# Patient Record
Sex: MALE | Race: WHITE | URBAN - METROPOLITAN AREA
[De-identification: names, ages, dates, MRNs, and addresses within clinical notes are randomized per-mention and may not be internally consistent; named-entity substitution may affect disease eponyms.]

---

## 2017-06-02 ENCOUNTER — OUTPATIENT (OUTPATIENT)
Dept: OUTPATIENT SERVICES | Facility: HOSPITAL | Age: 77
LOS: 1 days | Discharge: HOME | End: 2017-06-02
Payer: MEDICARE

## 2017-06-02 DIAGNOSIS — N40.0 BENIGN PROSTATIC HYPERPLASIA WITHOUT LOWER URINARY TRACT SYMPTOMS: ICD-10-CM

## 2017-06-02 DIAGNOSIS — K22.70 BARRETT'S ESOPHAGUS WITHOUT DYSPLASIA: ICD-10-CM

## 2017-06-02 DIAGNOSIS — N18.3 CHRONIC KIDNEY DISEASE, STAGE 3 (MODERATE): ICD-10-CM

## 2017-06-02 DIAGNOSIS — E78.5 HYPERLIPIDEMIA, UNSPECIFIED: ICD-10-CM

## 2017-06-02 DIAGNOSIS — E11.9 TYPE 2 DIABETES MELLITUS WITHOUT COMPLICATIONS: ICD-10-CM

## 2017-06-02 DIAGNOSIS — Z95.1 PRESENCE OF AORTOCORONARY BYPASS GRAFT: ICD-10-CM

## 2017-06-02 DIAGNOSIS — K21.9 GASTRO-ESOPHAGEAL REFLUX DISEASE WITHOUT ESOPHAGITIS: ICD-10-CM

## 2017-06-02 DIAGNOSIS — I25.10 ATHEROSCLEROTIC HEART DISEASE OF NATIVE CORONARY ARTERY WITHOUT ANGINA PECTORIS: ICD-10-CM

## 2017-06-02 DIAGNOSIS — Z01.818 ENCOUNTER FOR OTHER PREPROCEDURAL EXAMINATION: ICD-10-CM

## 2017-06-02 LAB
ANION GAP SERPL CALC-SCNC: 5 MMOL/L — SIGNIFICANT CHANGE UP (ref 5–17)
BASOPHILS # BLD AUTO: 0 K/UL — SIGNIFICANT CHANGE UP (ref 0–0.2)
BASOPHILS NFR BLD AUTO: 0.7 % — SIGNIFICANT CHANGE UP (ref 0–2)
BUN SERPL-MCNC: 29 MG/DL — HIGH (ref 7–23)
CALCIUM SERPL-MCNC: 8.4 MG/DL — LOW (ref 8.5–10.1)
CHLORIDE SERPL-SCNC: 111 MMOL/L — HIGH (ref 96–108)
CO2 SERPL-SCNC: 24 MMOL/L — SIGNIFICANT CHANGE UP (ref 22–31)
CREAT SERPL-MCNC: 1.8 MG/DL — HIGH (ref 0.5–1.3)
EOSINOPHIL # BLD AUTO: 0.2 K/UL — SIGNIFICANT CHANGE UP (ref 0–0.5)
EOSINOPHIL NFR BLD AUTO: 3.1 % — SIGNIFICANT CHANGE UP (ref 0–6)
GLUCOSE SERPL-MCNC: 111 MG/DL — HIGH (ref 70–99)
HCT VFR BLD CALC: 41.9 % — SIGNIFICANT CHANGE UP (ref 39–50)
HGB BLD-MCNC: 14.3 G/DL — SIGNIFICANT CHANGE UP (ref 13–17)
LYMPHOCYTES # BLD AUTO: 1.8 K/UL — SIGNIFICANT CHANGE UP (ref 1–3.3)
LYMPHOCYTES # BLD AUTO: 27.7 % — SIGNIFICANT CHANGE UP (ref 13–44)
MCHC RBC-ENTMCNC: 32 PG — SIGNIFICANT CHANGE UP (ref 27–34)
MCHC RBC-ENTMCNC: 34.1 GM/DL — SIGNIFICANT CHANGE UP (ref 32–36)
MCV RBC AUTO: 93.9 FL — SIGNIFICANT CHANGE UP (ref 80–100)
MONOCYTES # BLD AUTO: 0.6 K/UL — SIGNIFICANT CHANGE UP (ref 0–0.9)
MONOCYTES NFR BLD AUTO: 8.9 % — SIGNIFICANT CHANGE UP (ref 2–14)
NEUTROPHILS # BLD AUTO: 3.9 K/UL — SIGNIFICANT CHANGE UP (ref 1.8–7.4)
NEUTROPHILS NFR BLD AUTO: 59.6 % — SIGNIFICANT CHANGE UP (ref 43–77)
PLATELET # BLD AUTO: 180 K/UL — SIGNIFICANT CHANGE UP (ref 150–400)
POTASSIUM SERPL-MCNC: 4.6 MMOL/L — SIGNIFICANT CHANGE UP (ref 3.5–5.3)
POTASSIUM SERPL-SCNC: 4.6 MMOL/L — SIGNIFICANT CHANGE UP (ref 3.5–5.3)
RBC # BLD: 4.47 M/UL — SIGNIFICANT CHANGE UP (ref 4.2–5.8)
RBC # FLD: 12.2 % — SIGNIFICANT CHANGE UP (ref 10.3–14.5)
SODIUM SERPL-SCNC: 140 MMOL/L — SIGNIFICANT CHANGE UP (ref 135–145)
WBC # BLD: 6.6 K/UL — SIGNIFICANT CHANGE UP (ref 3.8–10.5)
WBC # FLD AUTO: 6.6 K/UL — SIGNIFICANT CHANGE UP (ref 3.8–10.5)

## 2017-06-02 PROCEDURE — 93010 ELECTROCARDIOGRAM REPORT: CPT

## 2017-06-13 ENCOUNTER — OUTPATIENT (OUTPATIENT)
Dept: OUTPATIENT SERVICES | Facility: HOSPITAL | Age: 77
LOS: 1 days | Discharge: HOME | End: 2017-06-13
Payer: MEDICARE

## 2017-06-13 VITALS
RESPIRATION RATE: 16 BRPM | TEMPERATURE: 97 F | WEIGHT: 178.79 LBS | DIASTOLIC BLOOD PRESSURE: 74 MMHG | HEIGHT: 68 IN | HEART RATE: 62 BPM | SYSTOLIC BLOOD PRESSURE: 145 MMHG | OXYGEN SATURATION: 97 %

## 2017-06-13 PROCEDURE — 88305 TISSUE EXAM BY PATHOLOGIST: CPT | Mod: 26

## 2017-06-13 PROCEDURE — 88312 SPECIAL STAINS GROUP 1: CPT | Mod: 26

## 2017-06-13 PROCEDURE — 88313 SPECIAL STAINS GROUP 2: CPT | Mod: 26

## 2017-06-13 RX ORDER — SODIUM CHLORIDE 9 MG/ML
1000 INJECTION INTRAMUSCULAR; INTRAVENOUS; SUBCUTANEOUS
Qty: 0 | Refills: 0 | Status: DISCONTINUED | OUTPATIENT
Start: 2017-06-13 | End: 2017-06-28

## 2017-06-13 RX ADMIN — SODIUM CHLORIDE 75 MILLILITER(S): 9 INJECTION INTRAMUSCULAR; INTRAVENOUS; SUBCUTANEOUS at 10:25

## 2017-06-13 NOTE — ASU PATIENT PROFILE, ADULT - PMH
Arteriosclerosis    ASHD (arteriosclerotic heart disease)    Rodriguez esophagus    BPH (benign prostatic hypertrophy)    Chronic kidney disease (CKD), stage 3 (moderate)    Diabetes 1.5, managed as type 2    GERD (gastroesophageal reflux disease)    Goiter colloid, toxic, nodular    HTN (hypertension)    Hypercholesteremia

## 2017-06-14 LAB — SURGICAL PATHOLOGY FINAL REPORT - CH: SIGNIFICANT CHANGE UP

## 2017-06-15 DIAGNOSIS — Z79.82 LONG TERM (CURRENT) USE OF ASPIRIN: ICD-10-CM

## 2017-06-15 DIAGNOSIS — K22.70 BARRETT'S ESOPHAGUS WITHOUT DYSPLASIA: ICD-10-CM

## 2017-06-15 DIAGNOSIS — I25.10 ATHEROSCLEROTIC HEART DISEASE OF NATIVE CORONARY ARTERY WITHOUT ANGINA PECTORIS: ICD-10-CM

## 2017-06-15 DIAGNOSIS — I10 ESSENTIAL (PRIMARY) HYPERTENSION: ICD-10-CM

## 2017-06-15 DIAGNOSIS — Z87.891 PERSONAL HISTORY OF NICOTINE DEPENDENCE: ICD-10-CM

## 2017-06-15 DIAGNOSIS — K44.9 DIAPHRAGMATIC HERNIA WITHOUT OBSTRUCTION OR GANGRENE: ICD-10-CM

## 2017-06-15 DIAGNOSIS — Z95.1 PRESENCE OF AORTOCORONARY BYPASS GRAFT: ICD-10-CM

## 2017-06-15 DIAGNOSIS — E78.5 HYPERLIPIDEMIA, UNSPECIFIED: ICD-10-CM

## 2017-06-15 DIAGNOSIS — N40.0 BENIGN PROSTATIC HYPERPLASIA WITHOUT LOWER URINARY TRACT SYMPTOMS: ICD-10-CM

## 2017-06-26 ENCOUNTER — OUTPATIENT (OUTPATIENT)
Dept: OUTPATIENT SERVICES | Facility: HOSPITAL | Age: 77
LOS: 1 days | Discharge: HOME | End: 2017-06-26

## 2017-06-26 DIAGNOSIS — Z95.1 PRESENCE OF AORTOCORONARY BYPASS GRAFT: Chronic | ICD-10-CM

## 2017-06-26 LAB
ANION GAP SERPL CALC-SCNC: 7 MMOL/L — SIGNIFICANT CHANGE UP (ref 5–17)
APTT BLD: 28.6 SEC — SIGNIFICANT CHANGE UP (ref 27.5–37.4)
BUN SERPL-MCNC: 21 MG/DL — SIGNIFICANT CHANGE UP (ref 7–23)
CALCIUM SERPL-MCNC: 8.7 MG/DL — SIGNIFICANT CHANGE UP (ref 8.5–10.1)
CHLORIDE SERPL-SCNC: 111 MMOL/L — HIGH (ref 96–108)
CO2 SERPL-SCNC: 24 MMOL/L — SIGNIFICANT CHANGE UP (ref 22–31)
CREAT SERPL-MCNC: 1.79 MG/DL — HIGH (ref 0.5–1.3)
GLUCOSE SERPL-MCNC: 116 MG/DL — HIGH (ref 70–99)
HCT VFR BLD CALC: 39.7 % — SIGNIFICANT CHANGE UP (ref 39–50)
HGB BLD-MCNC: 13.9 G/DL — SIGNIFICANT CHANGE UP (ref 13–17)
INR BLD: 1.04 RATIO — SIGNIFICANT CHANGE UP (ref 0.88–1.16)
MCHC RBC-ENTMCNC: 32.6 PG — SIGNIFICANT CHANGE UP (ref 27–34)
MCHC RBC-ENTMCNC: 35 GM/DL — SIGNIFICANT CHANGE UP (ref 32–36)
MCV RBC AUTO: 93.2 FL — SIGNIFICANT CHANGE UP (ref 80–100)
PLATELET # BLD AUTO: 163 K/UL — SIGNIFICANT CHANGE UP (ref 150–400)
POTASSIUM SERPL-MCNC: 4.3 MMOL/L — SIGNIFICANT CHANGE UP (ref 3.5–5.3)
POTASSIUM SERPL-SCNC: 4.3 MMOL/L — SIGNIFICANT CHANGE UP (ref 3.5–5.3)
PROTHROM AB SERPL-ACNC: 11.2 SEC — SIGNIFICANT CHANGE UP (ref 9.8–12.7)
RBC # BLD: 4.26 M/UL — SIGNIFICANT CHANGE UP (ref 4.2–5.8)
RBC # FLD: 11.8 % — SIGNIFICANT CHANGE UP (ref 10.3–14.5)
SODIUM SERPL-SCNC: 142 MMOL/L — SIGNIFICANT CHANGE UP (ref 135–145)
WBC # BLD: 6.1 K/UL — SIGNIFICANT CHANGE UP (ref 3.8–10.5)
WBC # FLD AUTO: 6.1 K/UL — SIGNIFICANT CHANGE UP (ref 3.8–10.5)

## 2017-06-26 NOTE — H&P CARDIOLOGY - FAMILY HISTORY
Sibling  Still living? Yes, Estimated age: Age Unknown  Family history of acute myocardial infarction, Age at diagnosis: Age Unknown

## 2017-06-26 NOTE — H&P CARDIOLOGY - HISTORY OF PRESENT ILLNESS
77yr old male PMH HTN, HLD, diet controlled DM, CKD stage III, CAD, CABG, BPH, GERD had a routine stress test which revealed 77yr old male PMH HTN, HLD, diet controlled DM, CKD stage III, CAD, CABG, BPH, GERD had a routine stress test which revealed abnormal myocardial perfusion consistent with single or double vessel disease. Pt now referred for [facility] for further evaluation. Pt. denies chest pain, SOB, palpitations, lightheadedness, dizziness, chills, fever.

## 2017-06-26 NOTE — H&P CARDIOLOGY - COMMENTS
77yr old male PMH HTN, HLD, diet controlled DM, CKD stage III, CAD, CABG, BPH, GERD had a routine stress test which revealed abnormal myocardial perfusion consistent with single or double vessel disease. Pt now referred for LHC for further evaluation.   LHC risks, benefits and alternatives discussed with patient. Risk discussed included, but not limited to MI, stroke, mortality, major bleeding, arrythmia, or infection. Consent obtained and signed educational material provided. Pt. verbalizes and understands pre-procedural instructions.

## 2017-06-27 ENCOUNTER — OUTPATIENT (OUTPATIENT)
Dept: OUTPATIENT SERVICES | Facility: HOSPITAL | Age: 77
LOS: 1 days | Discharge: HOME | End: 2017-06-27
Payer: MEDICARE

## 2017-06-27 VITALS
TEMPERATURE: 97 F | SYSTOLIC BLOOD PRESSURE: 183 MMHG | HEIGHT: 68 IN | DIASTOLIC BLOOD PRESSURE: 73 MMHG | WEIGHT: 175.05 LBS | HEART RATE: 58 BPM | OXYGEN SATURATION: 97 % | RESPIRATION RATE: 16 BRPM

## 2017-06-27 VITALS
HEART RATE: 75 BPM | RESPIRATION RATE: 16 BRPM | SYSTOLIC BLOOD PRESSURE: 162 MMHG | OXYGEN SATURATION: 97 % | DIASTOLIC BLOOD PRESSURE: 73 MMHG

## 2017-06-27 DIAGNOSIS — Z95.1 PRESENCE OF AORTOCORONARY BYPASS GRAFT: Chronic | ICD-10-CM

## 2017-06-27 LAB
ANION GAP SERPL CALC-SCNC: 8 MMOL/L — SIGNIFICANT CHANGE UP (ref 5–17)
BUN SERPL-MCNC: 24 MG/DL — HIGH (ref 7–23)
CALCIUM SERPL-MCNC: 8.6 MG/DL — SIGNIFICANT CHANGE UP (ref 8.5–10.1)
CHLORIDE SERPL-SCNC: 111 MMOL/L — HIGH (ref 96–108)
CO2 SERPL-SCNC: 25 MMOL/L — SIGNIFICANT CHANGE UP (ref 22–31)
CREAT SERPL-MCNC: 1.82 MG/DL — HIGH (ref 0.5–1.3)
GLUCOSE SERPL-MCNC: 112 MG/DL — HIGH (ref 70–99)
POTASSIUM SERPL-MCNC: 4.3 MMOL/L — SIGNIFICANT CHANGE UP (ref 3.5–5.3)
POTASSIUM SERPL-SCNC: 4.3 MMOL/L — SIGNIFICANT CHANGE UP (ref 3.5–5.3)
SODIUM SERPL-SCNC: 144 MMOL/L — SIGNIFICANT CHANGE UP (ref 135–145)

## 2017-06-27 PROCEDURE — 93459 L HRT ART/GRFT ANGIO: CPT | Mod: 26

## 2017-06-27 RX ORDER — NITROGLYCERIN 6.5 MG
1 CAPSULE, EXTENDED RELEASE ORAL ONCE
Qty: 0 | Refills: 0 | Status: DISCONTINUED | OUTPATIENT
Start: 2017-06-27 | End: 2017-07-12

## 2017-06-27 RX ORDER — METOPROLOL TARTRATE 50 MG
0 TABLET ORAL
Qty: 0 | Refills: 0 | COMMUNITY

## 2017-06-27 RX ORDER — FOLIC ACID 0.8 MG
1 TABLET ORAL
Qty: 0 | Refills: 0 | COMMUNITY

## 2017-06-27 NOTE — PROGRESS NOTE ADULT - SUBJECTIVE AND OBJECTIVE BOX
s/p LHC revealing patent grafts. Vein graft to OM1 supplies entire dominant LCX system, there is no disease present.    Patient feels well.  Denies chest pain, shortness of breath, dizziness or palpitations at this time    Right groin procedure site CDI.  no bleeding, no hematoma, site soft, non tender, positive pedal pulses bilaterally        HPI:  77yr old male PMH HTN, HLD, diet controlled DM, CKD stage III, CAD, CABG, BPH, GERD had a routine stress test which revealed abnormal myocardial perfusion consistent with single or double vessel [id number] old male PMH HTN, HLD, diet controlled DM, CKD stage III, CAD, CABG, BPH, GERD had a routine stress test which revealed  now s/p LHC revealing non obstructive CAD and patent grafts    --vital signs, labs, diet and activity per post procedure orders  -ambulate ad akilah post bedrest  -iv hydration for elevated creatinine  -encourage PO fluids  -plan of care discussed with patient, family and MD Fleischmanns  -d/c after bedrest if stable  -post procedure and d/c instructions reviewed  -follow up with MD Alexandra in 1-2 weeks  -consider ACE-I for better BP control at outpatient follow up   -Discussed therapeutic lifestyle changes to reduce risk factors such as following a cardiac diet, weight loss, maintaining a healthy weight, exercise, smoking cessation, medication compliance, and regular follow-up  with MD to know your numbers (BP, cholesterol, weight, and glucose)

## 2017-06-29 DIAGNOSIS — Z87.891 PERSONAL HISTORY OF NICOTINE DEPENDENCE: ICD-10-CM

## 2017-06-29 DIAGNOSIS — R94.39 ABNORMAL RESULT OF OTHER CARDIOVASCULAR FUNCTION STUDY: ICD-10-CM

## 2017-06-29 DIAGNOSIS — E11.9 TYPE 2 DIABETES MELLITUS WITHOUT COMPLICATIONS: ICD-10-CM

## 2017-06-29 DIAGNOSIS — N40.0 BENIGN PROSTATIC HYPERPLASIA WITHOUT LOWER URINARY TRACT SYMPTOMS: ICD-10-CM

## 2017-06-29 DIAGNOSIS — Z95.1 PRESENCE OF AORTOCORONARY BYPASS GRAFT: ICD-10-CM

## 2017-06-29 DIAGNOSIS — I12.9 HYPERTENSIVE CHRONIC KIDNEY DISEASE WITH STAGE 1 THROUGH STAGE 4 CHRONIC KIDNEY DISEASE, OR UNSPECIFIED CHRONIC KIDNEY DISEASE: ICD-10-CM

## 2017-06-29 DIAGNOSIS — I25.10 ATHEROSCLEROTIC HEART DISEASE OF NATIVE CORONARY ARTERY WITHOUT ANGINA PECTORIS: ICD-10-CM

## 2017-06-29 DIAGNOSIS — K21.9 GASTRO-ESOPHAGEAL REFLUX DISEASE WITHOUT ESOPHAGITIS: ICD-10-CM

## 2017-06-29 DIAGNOSIS — E78.5 HYPERLIPIDEMIA, UNSPECIFIED: ICD-10-CM

## 2017-06-29 DIAGNOSIS — N18.3 CHRONIC KIDNEY DISEASE, STAGE 3 (MODERATE): ICD-10-CM

## 2018-06-07 ENCOUNTER — OUTPATIENT (OUTPATIENT)
Dept: OUTPATIENT SERVICES | Facility: HOSPITAL | Age: 78
LOS: 1 days | Discharge: HOME | End: 2018-06-07
Payer: MEDICARE

## 2018-06-07 DIAGNOSIS — Z01.818 ENCOUNTER FOR OTHER PREPROCEDURAL EXAMINATION: ICD-10-CM

## 2018-06-07 DIAGNOSIS — D12.2 BENIGN NEOPLASM OF ASCENDING COLON: ICD-10-CM

## 2018-06-07 DIAGNOSIS — Z95.1 PRESENCE OF AORTOCORONARY BYPASS GRAFT: Chronic | ICD-10-CM

## 2018-06-07 DIAGNOSIS — K44.9 DIAPHRAGMATIC HERNIA WITHOUT OBSTRUCTION OR GANGRENE: ICD-10-CM

## 2018-06-07 DIAGNOSIS — K62.1 RECTAL POLYP: ICD-10-CM

## 2018-06-07 DIAGNOSIS — K22.710 BARRETT'S ESOPHAGUS WITH LOW GRADE DYSPLASIA: ICD-10-CM

## 2018-06-07 DIAGNOSIS — K31.89 OTHER DISEASES OF STOMACH AND DUODENUM: ICD-10-CM

## 2018-06-07 DIAGNOSIS — K21.0 GASTRO-ESOPHAGEAL REFLUX DISEASE WITH ESOPHAGITIS: ICD-10-CM

## 2018-06-07 DIAGNOSIS — Z86.010 PERSONAL HISTORY OF COLONIC POLYPS: ICD-10-CM

## 2018-06-07 DIAGNOSIS — K63.5 POLYP OF COLON: ICD-10-CM

## 2018-06-07 PROCEDURE — 93010 ELECTROCARDIOGRAM REPORT: CPT

## 2018-06-15 ENCOUNTER — RESULT REVIEW (OUTPATIENT)
Age: 78
End: 2018-06-15

## 2018-06-15 ENCOUNTER — OUTPATIENT (OUTPATIENT)
Dept: OUTPATIENT SERVICES | Facility: HOSPITAL | Age: 78
LOS: 1 days | Discharge: HOME | End: 2018-06-15
Payer: MEDICARE

## 2018-06-15 VITALS
HEIGHT: 68 IN | DIASTOLIC BLOOD PRESSURE: 85 MMHG | RESPIRATION RATE: 19 BRPM | TEMPERATURE: 97 F | HEART RATE: 70 BPM | OXYGEN SATURATION: 96 % | WEIGHT: 175.05 LBS | SYSTOLIC BLOOD PRESSURE: 155 MMHG

## 2018-06-15 DIAGNOSIS — Z95.1 PRESENCE OF AORTOCORONARY BYPASS GRAFT: Chronic | ICD-10-CM

## 2018-06-15 PROCEDURE — 88305 TISSUE EXAM BY PATHOLOGIST: CPT | Mod: 26

## 2018-06-15 RX ORDER — PREGABALIN 225 MG/1
1 CAPSULE ORAL
Qty: 0 | Refills: 0 | COMMUNITY

## 2018-06-19 LAB — SURGICAL PATHOLOGY FINAL REPORT - CH: SIGNIFICANT CHANGE UP

## 2018-06-20 DIAGNOSIS — Z79.82 LONG TERM (CURRENT) USE OF ASPIRIN: ICD-10-CM

## 2018-06-20 DIAGNOSIS — K22.710 BARRETT'S ESOPHAGUS WITH LOW GRADE DYSPLASIA: ICD-10-CM

## 2018-06-20 DIAGNOSIS — K31.89 OTHER DISEASES OF STOMACH AND DUODENUM: ICD-10-CM

## 2018-06-20 DIAGNOSIS — K63.5 POLYP OF COLON: ICD-10-CM

## 2018-06-20 DIAGNOSIS — E78.00 PURE HYPERCHOLESTEROLEMIA, UNSPECIFIED: ICD-10-CM

## 2018-06-20 DIAGNOSIS — Z87.891 PERSONAL HISTORY OF NICOTINE DEPENDENCE: ICD-10-CM

## 2018-06-20 DIAGNOSIS — Z86.010 PERSONAL HISTORY OF COLONIC POLYPS: ICD-10-CM

## 2018-06-20 DIAGNOSIS — N40.0 BENIGN PROSTATIC HYPERPLASIA WITHOUT LOWER URINARY TRACT SYMPTOMS: ICD-10-CM

## 2018-06-20 DIAGNOSIS — K57.30 DIVERTICULOSIS OF LARGE INTESTINE WITHOUT PERFORATION OR ABSCESS WITHOUT BLEEDING: ICD-10-CM

## 2018-06-20 DIAGNOSIS — K44.9 DIAPHRAGMATIC HERNIA WITHOUT OBSTRUCTION OR GANGRENE: ICD-10-CM

## 2018-06-20 DIAGNOSIS — I12.9 HYPERTENSIVE CHRONIC KIDNEY DISEASE WITH STAGE 1 THROUGH STAGE 4 CHRONIC KIDNEY DISEASE, OR UNSPECIFIED CHRONIC KIDNEY DISEASE: ICD-10-CM

## 2018-06-20 DIAGNOSIS — D12.2 BENIGN NEOPLASM OF ASCENDING COLON: ICD-10-CM

## 2018-06-20 DIAGNOSIS — K21.0 GASTRO-ESOPHAGEAL REFLUX DISEASE WITH ESOPHAGITIS: ICD-10-CM

## 2018-06-20 DIAGNOSIS — K62.1 RECTAL POLYP: ICD-10-CM

## 2018-06-20 DIAGNOSIS — Z95.1 PRESENCE OF AORTOCORONARY BYPASS GRAFT: ICD-10-CM

## 2018-06-20 DIAGNOSIS — I25.10 ATHEROSCLEROTIC HEART DISEASE OF NATIVE CORONARY ARTERY WITHOUT ANGINA PECTORIS: ICD-10-CM

## 2018-06-20 DIAGNOSIS — K64.8 OTHER HEMORRHOIDS: ICD-10-CM

## 2018-06-20 DIAGNOSIS — N18.3 CHRONIC KIDNEY DISEASE, STAGE 3 (MODERATE): ICD-10-CM

## 2018-06-20 DIAGNOSIS — E11.22 TYPE 2 DIABETES MELLITUS WITH DIABETIC CHRONIC KIDNEY DISEASE: ICD-10-CM

## 2019-06-07 PROBLEM — I25.10 ATHEROSCLEROTIC HEART DISEASE OF NATIVE CORONARY ARTERY WITHOUT ANGINA PECTORIS: Chronic | Status: ACTIVE | Noted: 2017-06-13

## 2019-06-07 PROBLEM — E78.00 PURE HYPERCHOLESTEROLEMIA, UNSPECIFIED: Chronic | Status: ACTIVE | Noted: 2017-06-13

## 2019-06-07 PROBLEM — N40.0 BENIGN PROSTATIC HYPERPLASIA WITHOUT LOWER URINARY TRACT SYMPTOMS: Chronic | Status: ACTIVE | Noted: 2017-06-13

## 2019-06-07 PROBLEM — I10 ESSENTIAL (PRIMARY) HYPERTENSION: Chronic | Status: ACTIVE | Noted: 2017-06-13

## 2019-06-07 PROBLEM — N18.3 CHRONIC KIDNEY DISEASE, STAGE 3 (MODERATE): Chronic | Status: ACTIVE | Noted: 2017-06-13

## 2019-06-07 PROBLEM — I70.90 UNSPECIFIED ATHEROSCLEROSIS: Chronic | Status: ACTIVE | Noted: 2017-06-13

## 2019-06-07 PROBLEM — K22.70 BARRETT'S ESOPHAGUS WITHOUT DYSPLASIA: Chronic | Status: ACTIVE | Noted: 2017-06-13

## 2019-06-07 PROBLEM — K21.9 GASTRO-ESOPHAGEAL REFLUX DISEASE WITHOUT ESOPHAGITIS: Chronic | Status: ACTIVE | Noted: 2017-06-13

## 2019-06-28 ENCOUNTER — OUTPATIENT (OUTPATIENT)
Dept: OUTPATIENT SERVICES | Facility: HOSPITAL | Age: 79
LOS: 1 days | Discharge: HOME | End: 2019-06-28
Payer: MEDICARE

## 2019-06-28 DIAGNOSIS — K22.710 BARRETT'S ESOPHAGUS WITH LOW GRADE DYSPLASIA: ICD-10-CM

## 2019-06-28 DIAGNOSIS — Z98.890 OTHER SPECIFIED POSTPROCEDURAL STATES: Chronic | ICD-10-CM

## 2019-06-28 DIAGNOSIS — Z95.1 PRESENCE OF AORTOCORONARY BYPASS GRAFT: Chronic | ICD-10-CM

## 2019-06-28 LAB
ANION GAP SERPL CALC-SCNC: 11 MMOL/L — SIGNIFICANT CHANGE UP (ref 5–17)
BASOPHILS # BLD AUTO: 0.05 K/UL — SIGNIFICANT CHANGE UP (ref 0–0.2)
BASOPHILS NFR BLD AUTO: 0.8 % — SIGNIFICANT CHANGE UP (ref 0–2)
BUN SERPL-MCNC: 29 MG/DL — HIGH (ref 7–23)
CALCIUM SERPL-MCNC: 8.8 MG/DL — SIGNIFICANT CHANGE UP (ref 8.5–10.1)
CHLORIDE SERPL-SCNC: 111 MMOL/L — HIGH (ref 96–108)
CO2 SERPL-SCNC: 22 MMOL/L — SIGNIFICANT CHANGE UP (ref 22–31)
CREAT SERPL-MCNC: 1.72 MG/DL — HIGH (ref 0.5–1.3)
EOSINOPHIL # BLD AUTO: 0.27 K/UL — SIGNIFICANT CHANGE UP (ref 0–0.5)
EOSINOPHIL NFR BLD AUTO: 4.1 % — SIGNIFICANT CHANGE UP (ref 0–6)
GLUCOSE SERPL-MCNC: 105 MG/DL — HIGH (ref 70–99)
HCT VFR BLD CALC: 42.3 % — SIGNIFICANT CHANGE UP (ref 39–50)
HGB BLD-MCNC: 13.8 G/DL — SIGNIFICANT CHANGE UP (ref 13–17)
IMM GRANULOCYTES NFR BLD AUTO: 1.4 % — SIGNIFICANT CHANGE UP (ref 0–1.5)
LYMPHOCYTES # BLD AUTO: 1.64 K/UL — SIGNIFICANT CHANGE UP (ref 1–3.3)
LYMPHOCYTES # BLD AUTO: 24.6 % — SIGNIFICANT CHANGE UP (ref 13–44)
MCHC RBC-ENTMCNC: 31.5 PG — SIGNIFICANT CHANGE UP (ref 27–34)
MCHC RBC-ENTMCNC: 32.6 GM/DL — SIGNIFICANT CHANGE UP (ref 32–36)
MCV RBC AUTO: 96.6 FL — SIGNIFICANT CHANGE UP (ref 80–100)
MONOCYTES # BLD AUTO: 0.52 K/UL — SIGNIFICANT CHANGE UP (ref 0–0.9)
MONOCYTES NFR BLD AUTO: 7.8 % — SIGNIFICANT CHANGE UP (ref 2–14)
NEUTROPHILS # BLD AUTO: 4.09 K/UL — SIGNIFICANT CHANGE UP (ref 1.8–7.4)
NEUTROPHILS NFR BLD AUTO: 61.3 % — SIGNIFICANT CHANGE UP (ref 43–77)
PLATELET # BLD AUTO: 174 K/UL — SIGNIFICANT CHANGE UP (ref 150–400)
POTASSIUM SERPL-MCNC: 4.2 MMOL/L — SIGNIFICANT CHANGE UP (ref 3.5–5.3)
POTASSIUM SERPL-SCNC: 4.2 MMOL/L — SIGNIFICANT CHANGE UP (ref 3.5–5.3)
RBC # BLD: 4.38 M/UL — SIGNIFICANT CHANGE UP (ref 4.2–5.8)
RBC # FLD: 13.1 % — SIGNIFICANT CHANGE UP (ref 10.3–14.5)
SODIUM SERPL-SCNC: 144 MMOL/L — SIGNIFICANT CHANGE UP (ref 135–145)
WBC # BLD: 6.66 K/UL — SIGNIFICANT CHANGE UP (ref 3.8–10.5)
WBC # FLD AUTO: 6.66 K/UL — SIGNIFICANT CHANGE UP (ref 3.8–10.5)

## 2019-06-28 PROCEDURE — 93010 ELECTROCARDIOGRAM REPORT: CPT

## 2019-06-28 RX ORDER — ESOMEPRAZOLE MAGNESIUM 40 MG/1
20 CAPSULE, DELAYED RELEASE ORAL
Qty: 0 | Refills: 0 | DISCHARGE

## 2019-06-28 RX ORDER — METOPROLOL TARTRATE 50 MG
0 TABLET ORAL
Qty: 0 | Refills: 0 | DISCHARGE

## 2019-06-28 NOTE — ASU PATIENT PROFILE, ADULT - PSH
H/O cardiac catheterization    History of esophagogastroduodenoscopy (EGD)    S/P CABG (coronary artery bypass graft)  double bypass, 2009  S/P colonoscopy with polypectomy ,DirectAddress_Unknown,DirectAddress_Unknown,[email or web address]

## 2019-06-28 NOTE — ASU PATIENT PROFILE, ADULT - PMH
Arteriosclerosis    ASHD (arteriosclerotic heart disease)    Rodriguez esophagus    BPH (benign prostatic hypertrophy)    Cervical stenosis of spine    Chronic kidney disease (CKD), stage 3 (moderate)    GERD (gastroesophageal reflux disease)    Goiter colloid, toxic, nodular  h/o  History of colon polyps    History of hemorrhoids    HTN (hypertension)    Hypercholesteremia    Urinary frequency

## 2019-06-28 NOTE — CHART NOTE - NSCHARTNOTEFT_GEN_A_CORE
BP left arm sitting 166/80  recheck 20 minutes later, right arm lying 163/74  HR 60 bpm  Roula 97.5 F  RR 14/min  O2 sat 98% on RA    80 yo male scheduled for EGD on 7/5/19 with Dr. Merino    1. CBC w diff, BMP  2. EKG  3. Medication & Pre-procedure instructions as per surgeon  4. Instructed on the need to have a ride home after the procedure

## 2019-07-05 ENCOUNTER — RESULT REVIEW (OUTPATIENT)
Age: 79
End: 2019-07-05

## 2019-07-05 ENCOUNTER — OUTPATIENT (OUTPATIENT)
Dept: OUTPATIENT SERVICES | Facility: HOSPITAL | Age: 79
LOS: 1 days | Discharge: HOME | End: 2019-07-05
Payer: MEDICARE

## 2019-07-05 VITALS
WEIGHT: 179.02 LBS | SYSTOLIC BLOOD PRESSURE: 168 MMHG | DIASTOLIC BLOOD PRESSURE: 87 MMHG | HEIGHT: 68 IN | OXYGEN SATURATION: 98 % | HEART RATE: 65 BPM | RESPIRATION RATE: 18 BRPM | TEMPERATURE: 97 F

## 2019-07-05 DIAGNOSIS — Z98.890 OTHER SPECIFIED POSTPROCEDURAL STATES: Chronic | ICD-10-CM

## 2019-07-05 DIAGNOSIS — Z95.1 PRESENCE OF AORTOCORONARY BYPASS GRAFT: Chronic | ICD-10-CM

## 2019-07-05 DIAGNOSIS — K22.710 BARRETT'S ESOPHAGUS WITH LOW GRADE DYSPLASIA: ICD-10-CM

## 2019-07-05 PROCEDURE — 88313 SPECIAL STAINS GROUP 2: CPT | Mod: 26

## 2019-07-05 PROCEDURE — 88305 TISSUE EXAM BY PATHOLOGIST: CPT | Mod: 26

## 2019-07-05 NOTE — ASU PATIENT PROFILE, ADULT - PSH
H/O cardiac catheterization    History of esophagogastroduodenoscopy (EGD)    S/P CABG (coronary artery bypass graft)  double bypass, 2009  S/P colonoscopy with polypectomy

## 2019-07-08 LAB — SURGICAL PATHOLOGY STUDY: SIGNIFICANT CHANGE UP

## 2019-07-10 DIAGNOSIS — K21.0 GASTRO-ESOPHAGEAL REFLUX DISEASE WITH ESOPHAGITIS: ICD-10-CM

## 2019-07-10 DIAGNOSIS — E78.00 PURE HYPERCHOLESTEROLEMIA, UNSPECIFIED: ICD-10-CM

## 2019-07-10 DIAGNOSIS — Z79.82 LONG TERM (CURRENT) USE OF ASPIRIN: ICD-10-CM

## 2019-07-10 DIAGNOSIS — K44.9 DIAPHRAGMATIC HERNIA WITHOUT OBSTRUCTION OR GANGRENE: ICD-10-CM

## 2019-07-10 DIAGNOSIS — K31.89 OTHER DISEASES OF STOMACH AND DUODENUM: ICD-10-CM

## 2019-07-10 DIAGNOSIS — K22.70 BARRETT'S ESOPHAGUS WITHOUT DYSPLASIA: ICD-10-CM

## 2019-07-10 DIAGNOSIS — I25.10 ATHEROSCLEROTIC HEART DISEASE OF NATIVE CORONARY ARTERY WITHOUT ANGINA PECTORIS: ICD-10-CM

## 2019-07-10 DIAGNOSIS — Z87.891 PERSONAL HISTORY OF NICOTINE DEPENDENCE: ICD-10-CM

## 2019-07-10 DIAGNOSIS — I12.9 HYPERTENSIVE CHRONIC KIDNEY DISEASE WITH STAGE 1 THROUGH STAGE 4 CHRONIC KIDNEY DISEASE, OR UNSPECIFIED CHRONIC KIDNEY DISEASE: ICD-10-CM

## 2019-07-10 DIAGNOSIS — Z09 ENCOUNTER FOR FOLLOW-UP EXAMINATION AFTER COMPLETED TREATMENT FOR CONDITIONS OTHER THAN MALIGNANT NEOPLASM: ICD-10-CM

## 2019-07-10 DIAGNOSIS — Z95.1 PRESENCE OF AORTOCORONARY BYPASS GRAFT: ICD-10-CM

## 2019-07-10 DIAGNOSIS — Z86.010 PERSONAL HISTORY OF COLONIC POLYPS: ICD-10-CM

## 2019-07-10 DIAGNOSIS — E04.2 NONTOXIC MULTINODULAR GOITER: ICD-10-CM

## 2019-07-10 DIAGNOSIS — E11.22 TYPE 2 DIABETES MELLITUS WITH DIABETIC CHRONIC KIDNEY DISEASE: ICD-10-CM

## 2019-07-10 DIAGNOSIS — N18.3 CHRONIC KIDNEY DISEASE, STAGE 3 (MODERATE): ICD-10-CM

## 2019-07-10 DIAGNOSIS — N40.0 BENIGN PROSTATIC HYPERPLASIA WITHOUT LOWER URINARY TRACT SYMPTOMS: ICD-10-CM

## 2020-10-06 NOTE — ASU PATIENT PROFILE, ADULT - PATIENT REPRESENTATIVE NAME
robibn moreau
  [facility] Pulmonolgy and Sleep Medicine  Pulmonology  [address] [place] [address]  Phone: [phone]  Fax:   Follow Up Time:

## 2021-05-26 NOTE — ASU PREOP CHECKLIST - BP NONINVASIVE SYSTOLIC (MM HG)
The right coronary artery was selectively engaged and injected. Multiple views of the injected vessel were taken.  168

## 2021-08-11 PROBLEM — M48.02 SPINAL STENOSIS, CERVICAL REGION: Chronic | Status: ACTIVE | Noted: 2019-06-28

## 2021-08-11 PROBLEM — E05.20 THYROTOXICOSIS WITH TOXIC MULTINODULAR GOITER WITHOUT THYROTOXIC CRISIS OR STORM: Chronic | Status: ACTIVE | Noted: 2017-06-13

## 2021-08-11 PROBLEM — R35.0 FREQUENCY OF MICTURITION: Chronic | Status: ACTIVE | Noted: 2019-06-28

## 2021-08-11 PROBLEM — Z86.010 PERSONAL HISTORY OF COLONIC POLYPS: Chronic | Status: ACTIVE | Noted: 2019-06-28

## 2021-08-11 PROBLEM — Z87.19 PERSONAL HISTORY OF OTHER DISEASES OF THE DIGESTIVE SYSTEM: Chronic | Status: ACTIVE | Noted: 2019-06-28

## 2021-09-07 ENCOUNTER — APPOINTMENT (OUTPATIENT)
Dept: DISASTER EMERGENCY | Facility: CLINIC | Age: 81
End: 2021-09-07

## 2021-09-07 DIAGNOSIS — Z01.818 ENCOUNTER FOR OTHER PREPROCEDURAL EXAMINATION: ICD-10-CM

## 2021-09-07 PROBLEM — Z00.00 ENCOUNTER FOR PREVENTIVE HEALTH EXAMINATION: Status: ACTIVE | Noted: 2021-09-07

## 2021-09-07 LAB — SARS-COV-2 N GENE NPH QL NAA+PROBE: NOT DETECTED

## 2021-09-10 ENCOUNTER — OUTPATIENT (OUTPATIENT)
Dept: OUTPATIENT SERVICES | Facility: HOSPITAL | Age: 81
LOS: 1 days | Discharge: HOME | End: 2021-09-10
Payer: MEDICARE

## 2021-09-10 ENCOUNTER — RESULT REVIEW (OUTPATIENT)
Age: 81
End: 2021-09-10

## 2021-09-10 VITALS
TEMPERATURE: 97 F | DIASTOLIC BLOOD PRESSURE: 88 MMHG | OXYGEN SATURATION: 99 % | HEART RATE: 66 BPM | SYSTOLIC BLOOD PRESSURE: 175 MMHG | RESPIRATION RATE: 13 BRPM | WEIGHT: 154.98 LBS | HEIGHT: 68 IN

## 2021-09-10 DIAGNOSIS — Z98.890 OTHER SPECIFIED POSTPROCEDURAL STATES: Chronic | ICD-10-CM

## 2021-09-10 DIAGNOSIS — Z95.1 PRESENCE OF AORTOCORONARY BYPASS GRAFT: Chronic | ICD-10-CM

## 2021-09-10 DIAGNOSIS — K22.70 BARRETT'S ESOPHAGUS WITHOUT DYSPLASIA: ICD-10-CM

## 2021-09-10 DIAGNOSIS — Z86.010 PERSONAL HISTORY OF COLONIC POLYPS: ICD-10-CM

## 2021-09-10 PROCEDURE — 88305 TISSUE EXAM BY PATHOLOGIST: CPT | Mod: 26

## 2021-09-10 PROCEDURE — 88312 SPECIAL STAINS GROUP 1: CPT

## 2021-09-10 PROCEDURE — 88313 SPECIAL STAINS GROUP 2: CPT

## 2021-09-10 PROCEDURE — 88312 SPECIAL STAINS GROUP 1: CPT | Mod: 26

## 2021-09-10 PROCEDURE — 88305 TISSUE EXAM BY PATHOLOGIST: CPT

## 2021-09-10 PROCEDURE — 88313 SPECIAL STAINS GROUP 2: CPT | Mod: 26

## 2021-09-10 RX ORDER — SIMVASTATIN 20 MG/1
1 TABLET, FILM COATED ORAL
Qty: 0 | Refills: 0 | DISCHARGE

## 2021-09-10 NOTE — ASU PATIENT PROFILE, ADULT - NSICDXPASTSURGICALHX_GEN_ALL_CORE_FT
PAST SURGICAL HISTORY:  H/O cardiac catheterization     History of esophagogastroduodenoscopy (EGD)     S/P CABG (coronary artery bypass graft) double bypass, 2009    S/P colonoscopy with polypectomy

## 2021-09-10 NOTE — ASU PATIENT PROFILE, ADULT - NSICDXPASTMEDICALHX_GEN_ALL_CORE_FT
PAST MEDICAL HISTORY:  Arteriosclerosis     ASHD (arteriosclerotic heart disease)     Rodriguez esophagus     BPH (benign prostatic hypertrophy)     Cervical stenosis of spine     Chronic kidney disease (CKD), stage 3 (moderate)     GERD (gastroesophageal reflux disease)     Goiter colloid, toxic, nodular h/o    History of colon polyps     History of hemorrhoids     HTN (hypertension)     Hypercholesteremia     Urinary frequency

## 2021-09-15 DIAGNOSIS — I12.9 HYPERTENSIVE CHRONIC KIDNEY DISEASE WITH STAGE 1 THROUGH STAGE 4 CHRONIC KIDNEY DISEASE, OR UNSPECIFIED CHRONIC KIDNEY DISEASE: ICD-10-CM

## 2021-09-15 DIAGNOSIS — N18.31 CHRONIC KIDNEY DISEASE, STAGE 3A: ICD-10-CM

## 2021-09-15 DIAGNOSIS — E78.5 HYPERLIPIDEMIA, UNSPECIFIED: ICD-10-CM

## 2021-09-15 DIAGNOSIS — K64.8 OTHER HEMORRHOIDS: ICD-10-CM

## 2021-09-15 DIAGNOSIS — E11.22 TYPE 2 DIABETES MELLITUS WITH DIABETIC CHRONIC KIDNEY DISEASE: ICD-10-CM

## 2021-09-15 DIAGNOSIS — K57.30 DIVERTICULOSIS OF LARGE INTESTINE WITHOUT PERFORATION OR ABSCESS WITHOUT BLEEDING: ICD-10-CM

## 2021-09-15 DIAGNOSIS — Z12.11 ENCOUNTER FOR SCREENING FOR MALIGNANT NEOPLASM OF COLON: ICD-10-CM

## 2021-09-15 DIAGNOSIS — K44.9 DIAPHRAGMATIC HERNIA WITHOUT OBSTRUCTION OR GANGRENE: ICD-10-CM

## 2021-09-15 DIAGNOSIS — K22.70 BARRETT'S ESOPHAGUS WITHOUT DYSPLASIA: ICD-10-CM

## 2021-09-15 DIAGNOSIS — Z79.82 LONG TERM (CURRENT) USE OF ASPIRIN: ICD-10-CM

## 2021-09-15 DIAGNOSIS — N40.0 BENIGN PROSTATIC HYPERPLASIA WITHOUT LOWER URINARY TRACT SYMPTOMS: ICD-10-CM

## 2021-09-15 DIAGNOSIS — Z95.1 PRESENCE OF AORTOCORONARY BYPASS GRAFT: ICD-10-CM

## 2021-09-15 DIAGNOSIS — I25.10 ATHEROSCLEROTIC HEART DISEASE OF NATIVE CORONARY ARTERY WITHOUT ANGINA PECTORIS: ICD-10-CM

## 2021-09-15 DIAGNOSIS — Z87.891 PERSONAL HISTORY OF NICOTINE DEPENDENCE: ICD-10-CM

## 2021-09-15 DIAGNOSIS — D12.3 BENIGN NEOPLASM OF TRANSVERSE COLON: ICD-10-CM

## 2022-06-30 NOTE — ASU PREOP CHECKLIST - NS PREOP CHK HIBICLENS NA
Pt is an 86 yo male with a pmh/o Anemia, HLD, Afib, R Renal lesion, diverticulitis, prostate ca with mets to bone, bilateral lower extremity cellulitis, and recent discharge for septic arthritis dc'd on Ancef with right PICC line.  He is sent from [facility] due to acute change in mental status.  Per the patient's wife he is usually AAOx4 and very talkative.  Today he is lethargic, not responding appropriately to questions and ill appearing.  Per records there is no sob, cough, chest pain, palpitations, nausea, vomiting, leg swelling, orthopnea, headache, abd pain, diarrhea, constipation, rash, paresthesias, changes in vision/hearing/speech/gait.      ED course: Lactate elevated on admission 2.7 and repeat elevated at 2.6  WBC mildly elevated  right PICC appears clean N/A

## 2022-07-22 NOTE — ASU PREOP CHECKLIST - PATIENT'S PERSONAL PROPERTY REMOVED
Order Information for Peggy Chang [[id number]]   Request Summary [[id number]]   Ordering Encounter Report      Procedure: OPEN ACCESS COLONOSCOPY Status: Requested appt date:   Proc # [id number]     Needs Scheduling   Authorizing: Ana Michelle NP in OS[facility] INT MED [facility] [address]       Priority: Routine   Diagnosis: Colon cancer screening [Z12.11]        glasses

## 2022-08-09 NOTE — ASU PATIENT PROFILE, ADULT - PATIENT'S HEIGHT AND WEIGHT RECORDED IN THE VITAL SIGNS FLOWSHEET
History was provided by the patient and parent.   Chief Complaint   Patient presents with   • Well Child       HPI   Patient's medication, allergies, past medical, surgical, social, and family histories were reviewed and updated as appropriate.    Grade: 10th Grade  Performance: Good  Sports: baseball, basketball  Sleep: Normal  Mood/Emotional: Normal for age.  Diet/Nutrition: Variety of foods, does not skip meals.     Identifies self as male  Attracted to: female  Sexually active? Never been sexually active  Etoh/Tobacco/illicit drug use/vaping: Denies  Concerns today:No    Saw Dr. Corey for low IgA, and thrush. LFTs normalized on repeat testing. Reviewed note.      History of concussions: No  History of fractures: yes, recovered without complications.   History of syncope: No  History of seizures: No  Family history of sudden death: No  Family history of heart disease before <55 years old? No      Review of Systems   All other systems reviewed and are negative.      Objective:   Recent PHQ 2/9 Score    PHQ 2:  Date Peds PHQ 2 Score Peds PHQ 2 Interpretation   8/9/2022 0 No further screening needed     Visit Vitals  /64   Pulse 72   Temp 98.4 °F (36.9 °C) (Temporal)   Resp 18   Ht 6' 0.36\" (1.838 m)   Wt 61.2 kg (135 lb)   SpO2 99%   BMI 18.13 kg/m²     Blood pressure reading is in the normal blood pressure range based on the 2017 AAP Clinical Practice Guideline.    Physical Exam  Vitals and nursing note reviewed. Exam conducted with a chaperone present.   HENT:      Head: Normocephalic.      Right Ear: Tympanic membrane and ear canal normal.      Left Ear: Tympanic membrane and ear canal normal.      Nose: Nose normal.      Mouth/Throat:      Mouth: Mucous membranes are moist.      Pharynx: No posterior oropharyngeal erythema.   Eyes:      Extraocular Movements: Extraocular movements intact.      Conjunctiva/sclera: Conjunctivae normal.      Pupils: Pupils are equal, round, and reactive to light.   Neck:       Thyroid: No thyromegaly.   Cardiovascular:      Rate and Rhythm: Normal rate and regular rhythm.      Heart sounds: Normal heart sounds. No murmur heard.  Pulmonary:      Effort: Pulmonary effort is normal. No respiratory distress.      Breath sounds: Normal breath sounds.   Abdominal:      Palpations: Abdomen is soft. There is no mass.      Tenderness: There is no abdominal tenderness.   Genitourinary:     Penis: Normal.       Testes: Normal.      Comments: Alex 3  Musculoskeletal:         General: Normal range of motion.      Cervical back: Normal range of motion and neck supple.   Skin:     Findings: No rash.   Neurological:      General: No focal deficit present.      Mental Status: He is alert.      Motor: No abnormal muscle tone.      Deep Tendon Reflexes: Reflexes are normal and symmetric.   Psychiatric:         Mood and Affect: Mood normal.                Assessment:     1. Well adolescent visit         Plan:   Normal growth and development. Discussed BMI.   PHQ2 reviewed, within normal limits.   Discussed age appropriate anticipatory guidance: Bright futures handout given.   Cleared for school and sports participation.   Counseling for all vaccine components completed and CDC/VIS handouts were provided. The risks and benefits of the vaccines were discussed with parent/patient.  Questions answered.    No orders of the defined types were placed in this encounter.      8/9/2022  Jaylin Carter MD  11:29 AM         yes

## 2022-09-20 ENCOUNTER — OUTPATIENT (OUTPATIENT)
Dept: OUTPATIENT SERVICES | Facility: HOSPITAL | Age: 82
LOS: 1 days | Discharge: HOME | End: 2022-09-20
Payer: MEDICARE

## 2022-09-20 ENCOUNTER — RESULT REVIEW (OUTPATIENT)
Age: 82
End: 2022-09-20

## 2022-09-20 VITALS
DIASTOLIC BLOOD PRESSURE: 91 MMHG | HEART RATE: 64 BPM | WEIGHT: 158.07 LBS | OXYGEN SATURATION: 95 % | TEMPERATURE: 98 F | SYSTOLIC BLOOD PRESSURE: 189 MMHG | HEIGHT: 68 IN | RESPIRATION RATE: 20 BRPM

## 2022-09-20 DIAGNOSIS — Z98.890 OTHER SPECIFIED POSTPROCEDURAL STATES: Chronic | ICD-10-CM

## 2022-09-20 DIAGNOSIS — Z95.1 PRESENCE OF AORTOCORONARY BYPASS GRAFT: Chronic | ICD-10-CM

## 2022-09-20 DIAGNOSIS — K21.9 GASTRO-ESOPHAGEAL REFLUX DISEASE WITHOUT ESOPHAGITIS: ICD-10-CM

## 2022-09-20 DIAGNOSIS — K22.70 BARRETT'S ESOPHAGUS WITHOUT DYSPLASIA: ICD-10-CM

## 2022-09-20 PROCEDURE — 88312 SPECIAL STAINS GROUP 1: CPT

## 2022-09-20 PROCEDURE — 88313 SPECIAL STAINS GROUP 2: CPT | Mod: 26

## 2022-09-20 PROCEDURE — 88312 SPECIAL STAINS GROUP 1: CPT | Mod: 26

## 2022-09-20 PROCEDURE — 88305 TISSUE EXAM BY PATHOLOGIST: CPT

## 2022-09-20 PROCEDURE — 88305 TISSUE EXAM BY PATHOLOGIST: CPT | Mod: 26

## 2022-09-20 PROCEDURE — 88313 SPECIAL STAINS GROUP 2: CPT

## 2022-09-20 RX ORDER — LISINOPRIL 2.5 MG/1
1 TABLET ORAL
Qty: 0 | Refills: 0 | DISCHARGE

## 2022-09-20 RX ORDER — METOPROLOL TARTRATE 50 MG
0.5 TABLET ORAL
Qty: 0 | Refills: 0 | DISCHARGE

## 2022-09-20 RX ORDER — FOLIC ACID 0.8 MG
1 TABLET ORAL
Qty: 0 | Refills: 0 | DISCHARGE

## 2022-09-20 RX ORDER — TAMSULOSIN HYDROCHLORIDE 0.4 MG/1
1 CAPSULE ORAL
Qty: 0 | Refills: 0 | DISCHARGE

## 2022-09-20 RX ORDER — ATORVASTATIN CALCIUM 80 MG/1
1 TABLET, FILM COATED ORAL
Qty: 0 | Refills: 0 | DISCHARGE

## 2022-09-20 RX ORDER — OMEPRAZOLE 10 MG/1
1 CAPSULE, DELAYED RELEASE ORAL
Qty: 0 | Refills: 0 | DISCHARGE

## 2022-09-20 RX ORDER — ASPIRIN/CALCIUM CARB/MAGNESIUM 324 MG
1 TABLET ORAL
Qty: 0 | Refills: 0 | DISCHARGE

## 2022-09-20 NOTE — ASU PATIENT PROFILE, ADULT - FALL HARM RISK - UNIVERSAL INTERVENTIONS
Bed in lowest position, wheels locked, appropriate side rails in place/Call bell, personal items and telephone in reach/Instruct patient to call for assistance before getting out of bed or chair/Non-slip footwear when patient is out of bed/[place] to call system/Physically safe environment - no spills, clutter or unnecessary equipment/Purposeful Proactive Rounding/Room/bathroom lighting operational, light cord in reach

## 2022-09-20 NOTE — ASU PATIENT PROFILE, ADULT - NSICDXPASTMEDICALHX_GEN_ALL_CORE_FT
PAST MEDICAL HISTORY:  Arteriosclerosis     ASHD (arteriosclerotic heart disease)     Rodriguez esophagus     BPH (benign prostatic hypertrophy)     Cervical stenosis of spine     Chronic kidney disease (CKD), stage 3 (moderate)     GERD (gastroesophageal reflux disease)     Goiter colloid, toxic, nodular h/o    History of colon polyps     History of hemorrhoids     HTN (hypertension)     Hypercholesteremia     Hyperlipidemia     Hypertension     Stage 3 chronic kidney disease     Type 2 diabetes mellitus     Urinary frequency

## 2022-09-22 DIAGNOSIS — K44.9 DIAPHRAGMATIC HERNIA WITHOUT OBSTRUCTION OR GANGRENE: ICD-10-CM

## 2022-09-22 DIAGNOSIS — I12.9 HYPERTENSIVE CHRONIC KIDNEY DISEASE WITH STAGE 1 THROUGH STAGE 4 CHRONIC KIDNEY DISEASE, OR UNSPECIFIED CHRONIC KIDNEY DISEASE: ICD-10-CM

## 2022-09-22 DIAGNOSIS — Z79.82 LONG TERM (CURRENT) USE OF ASPIRIN: ICD-10-CM

## 2022-09-22 DIAGNOSIS — K22.70 BARRETT'S ESOPHAGUS WITHOUT DYSPLASIA: ICD-10-CM

## 2022-09-22 DIAGNOSIS — N18.30 CHRONIC KIDNEY DISEASE, STAGE 3 UNSPECIFIED: ICD-10-CM

## 2022-09-22 DIAGNOSIS — E11.22 TYPE 2 DIABETES MELLITUS WITH DIABETIC CHRONIC KIDNEY DISEASE: ICD-10-CM

## 2022-09-22 DIAGNOSIS — Z87.891 PERSONAL HISTORY OF NICOTINE DEPENDENCE: ICD-10-CM

## 2022-09-22 DIAGNOSIS — N40.0 BENIGN PROSTATIC HYPERPLASIA WITHOUT LOWER URINARY TRACT SYMPTOMS: ICD-10-CM

## 2022-09-22 DIAGNOSIS — K21.00 GASTRO-ESOPHAGEAL REFLUX DISEASE WITH ESOPHAGITIS, WITHOUT BLEEDING: ICD-10-CM

## 2022-09-22 DIAGNOSIS — Z95.1 PRESENCE OF AORTOCORONARY BYPASS GRAFT: ICD-10-CM

## 2022-09-22 DIAGNOSIS — E78.00 PURE HYPERCHOLESTEROLEMIA, UNSPECIFIED: ICD-10-CM

## 2022-09-22 DIAGNOSIS — I25.10 ATHEROSCLEROTIC HEART DISEASE OF NATIVE CORONARY ARTERY WITHOUT ANGINA PECTORIS: ICD-10-CM

## 2022-11-17 ENCOUNTER — OFFICE (OUTPATIENT)
Dept: URBAN - METROPOLITAN AREA CLINIC 115 | Facility: CLINIC | Age: 82
Setting detail: OPHTHALMOLOGY
End: 2022-11-17
Payer: MEDICARE

## 2022-11-17 DIAGNOSIS — H35.371: ICD-10-CM

## 2022-11-17 PROCEDURE — 99024 POSTOP FOLLOW-UP VISIT: CPT | Performed by: OPHTHALMOLOGY

## 2022-11-17 ASSESSMENT — REFRACTION_AUTOREFRACTION
OD_SPHERE: +0.75
OS_AXIS: 010
OD_AXIS: 113
OD_CYLINDER: -0.75
OS_CYLINDER: -0.25
OS_SPHERE: +0.25

## 2022-11-17 ASSESSMENT — KERATOMETRY
OS_K1POWER_DIOPTERS: 43.00
OS_K2POWER_DIOPTERS: 43.75
OD_AXISANGLE_DEGREES: 042
OD_K2POWER_DIOPTERS: 44.00
OS_AXISANGLE_DEGREES: 088
OD_K1POWER_DIOPTERS: 43.25

## 2022-11-17 ASSESSMENT — SPHEQUIV_DERIVED
OD_SPHEQUIV: 0.375
OS_SPHEQUIV: 0.125

## 2022-11-17 ASSESSMENT — TONOMETRY
OS_IOP_MMHG: 11
OD_IOP_MMHG: 15

## 2022-11-17 ASSESSMENT — AXIALLENGTH_DERIVED
OD_AL: 23.4014
OS_AL: 23.5891

## 2022-11-17 ASSESSMENT — VISUAL ACUITY
OS_BCVA: 20/25-1
OD_BCVA: 20/25

## 2022-12-04 ENCOUNTER — OFFICE (OUTPATIENT)
Dept: URBAN - METROPOLITAN AREA CLINIC 12 | Facility: CLINIC | Age: 82
Setting detail: OPHTHALMOLOGY
End: 2022-12-04
Payer: MEDICARE

## 2022-12-04 DIAGNOSIS — H16.223: ICD-10-CM

## 2022-12-04 DIAGNOSIS — H52.13: ICD-10-CM

## 2022-12-04 PROCEDURE — 92015 DETERMINE REFRACTIVE STATE: CPT | Performed by: OPTOMETRIST

## 2022-12-04 PROCEDURE — 99213 OFFICE O/P EST LOW 20 MIN: CPT | Performed by: OPTOMETRIST

## 2022-12-04 ASSESSMENT — REFRACTION_MANIFEST
OS_ADD: +3.00
OS_CYLINDER: -0.25
OS_VA1: 20/25-2
OD_SPHERE: +0.25
OD_VA1: 20/25-2
OD_ADD: +3.00
OS_AXIS: 180
OD_AXIS: 100
OD_CYLINDER: -0.50
OS_SPHERE: -0.25

## 2022-12-04 ASSESSMENT — AXIALLENGTH_DERIVED
OS_AL: 23.6925
OD_AL: 23.5461
OS_AL: 23.6925
OD_AL: 23.5461

## 2022-12-04 ASSESSMENT — REFRACTION_AUTOREFRACTION
OS_AXIS: 177
OS_CYLINDER: -0.25
OS_SPHERE: -0.25
OD_CYLINDER: -0.50
OD_AXIS: 101
OD_SPHERE: +0.25

## 2022-12-04 ASSESSMENT — KERATOMETRY
OS_AXISANGLE_DEGREES: 080
OS_K2POWER_DIOPTERS: 44.25
OD_K2POWER_DIOPTERS: 44.00
OD_AXISANGLE_DEGREES: 139
OD_K1POWER_DIOPTERS: 43.25
OS_K1POWER_DIOPTERS: 43.00

## 2022-12-04 ASSESSMENT — SUPERFICIAL PUNCTATE KERATITIS (SPK)
OS_SPK: 1+ 2+
OD_SPK: 1+ 2+

## 2022-12-04 ASSESSMENT — TONOMETRY
OS_IOP_MMHG: 16
OD_IOP_MMHG: 16

## 2022-12-04 ASSESSMENT — VISUAL ACUITY
OD_BCVA: 20/30-2
OS_BCVA: 20/25-3

## 2022-12-04 ASSESSMENT — SPHEQUIV_DERIVED
OD_SPHEQUIV: 0
OD_SPHEQUIV: 0
OS_SPHEQUIV: -0.375
OS_SPHEQUIV: -0.375

## 2022-12-04 ASSESSMENT — CONFRONTATIONAL VISUAL FIELD TEST (CVF)
OS_FINDINGS: FULL
OD_FINDINGS: FULL

## 2022-12-05 ENCOUNTER — OFFICE (OUTPATIENT)
Dept: URBAN - METROPOLITAN AREA CLINIC 12 | Facility: CLINIC | Age: 82
Setting detail: OPHTHALMOLOGY
End: 2022-12-05
Payer: MEDICARE

## 2022-12-05 DIAGNOSIS — H52.13: ICD-10-CM

## 2022-12-05 PROBLEM — H16.223 DRY EYE SYNDROME K SICCA; BOTH EYES: Status: ACTIVE | Noted: 2022-12-04

## 2022-12-05 PROCEDURE — N/C NO CHARGE: Performed by: OPHTHALMOLOGY

## 2022-12-05 ASSESSMENT — REFRACTION_MANIFEST
OD_ADD: +3.00
OD_CYLINDER: -0.50
OD_SPHERE: +0.25
OD_AXIS: 100
OS_CYLINDER: -0.25
OS_SPHERE: -0.25
OS_ADD: +3.00
OS_VA1: 20/25-2
OD_VA1: 20/25-2
OS_AXIS: 180

## 2022-12-05 ASSESSMENT — SPHEQUIV_DERIVED
OS_SPHEQUIV: -0.375
OD_SPHEQUIV: 0

## 2022-12-05 ASSESSMENT — VISUAL ACUITY
OD_BCVA: 20/
OS_BCVA: 20/

## 2023-05-22 ENCOUNTER — OFFICE (OUTPATIENT)
Dept: URBAN - METROPOLITAN AREA CLINIC 88 | Facility: CLINIC | Age: 83
Setting detail: OPHTHALMOLOGY
End: 2023-05-22
Payer: MEDICARE

## 2023-05-22 DIAGNOSIS — H34.8322: ICD-10-CM

## 2023-05-22 DIAGNOSIS — H35.372: ICD-10-CM

## 2023-05-22 DIAGNOSIS — H35.351: ICD-10-CM

## 2023-05-22 DIAGNOSIS — H35.371: ICD-10-CM

## 2023-05-22 PROCEDURE — 92134 CPTRZ OPH DX IMG PST SGM RTA: CPT | Performed by: SPECIALIST

## 2023-05-22 PROCEDURE — 92012 INTRM OPH EXAM EST PATIENT: CPT | Performed by: SPECIALIST

## 2023-05-22 ASSESSMENT — SUPERFICIAL PUNCTATE KERATITIS (SPK)
OS_SPK: 1+ 2+
OD_SPK: 1+ 2+

## 2023-05-22 ASSESSMENT — REFRACTION_AUTOREFRACTION
OD_AXIS: 101
OS_SPHERE: -0.25
OD_CYLINDER: -0.50
OS_CYLINDER: -0.25
OS_AXIS: 177
OD_SPHERE: +0.25

## 2023-05-22 ASSESSMENT — KERATOMETRY
OD_AXISANGLE_DEGREES: 139
OS_AXISANGLE_DEGREES: 080
OD_K1POWER_DIOPTERS: 43.25
OS_K1POWER_DIOPTERS: 43.00
OD_K2POWER_DIOPTERS: 44.00
OS_K2POWER_DIOPTERS: 44.25

## 2023-05-22 ASSESSMENT — AXIALLENGTH_DERIVED
OS_AL: 23.6925
OD_AL: 23.5461

## 2023-05-22 ASSESSMENT — CONFRONTATIONAL VISUAL FIELD TEST (CVF)
OS_FINDINGS: FULL
OD_FINDINGS: FULL

## 2023-05-22 ASSESSMENT — SPHEQUIV_DERIVED
OS_SPHEQUIV: -0.375
OD_SPHEQUIV: 0

## 2023-05-22 ASSESSMENT — VISUAL ACUITY
OS_BCVA: 20/25-2
OD_BCVA: 20/20-

## 2023-05-22 ASSESSMENT — TONOMETRY
OD_IOP_MMHG: 17
OS_IOP_MMHG: 17

## 2023-10-23 NOTE — ASU PATIENT PROFILE, ADULT - NS SC CAGE ALCOHOL GUILTY ABOUT
-- DO NOT REPLY / DO NOT REPLY ALL --  -- Message is from Engagement Center Operations (ECO) --    Request Result  Is the patient currently having Emergent symptoms?: No    Which result are you requesting?: CT Kidney     What is the full name of the provider that ordered the lab or test?: Ian Norwood     Caller Information       Type Contact Phone/Fax    10/23/2023 02:27 PM CDT Phone (Incoming) Seema De Leon (Self) [phone] (M)          Alternative phone number: n/a    Clinic site name / Account # for ordering provider: [facility] [address]     Can a detailed message be left?: Yes    Message Turnaround: [place]:    Refer to site's KB page for routing instructions    Please give this turnaround time to the caller:   \"You can expect to receive a response 1-3 business days after your provider's clinical team reviews the message\"    Inform patients: \"Please be aware the return phone call may come from an unidentified or out of state phone number.\"  
no

## 2024-01-22 NOTE — ASU PREOP CHECKLIST - NS PREOP CHK MONITOR ANESTHESIA CONSENT
2024       RE: Rajni Lara  [address]  [place] [address]       Dear Colleague,    Thank you for referring your patient, Rajni Lara, to the [facility] [place] at [facility]. Please see a copy of my visit note below.    [facility] and [facility] [place]  Neurology Progress Note - Neuromuscular Division  [facility] clinic    Patient Name:  Rajni Lara    MRN:  [id number]   :  1957  Date of Service:  2024  Primary care provider:  Christopher Field      History of Present Illness:   Rajni Lara is a 66 year old man who presents to the [facility] Neuromuscular department [facility] clinic for follow up of genetically confirmed type 2 myotonic dystrophy.      He was last seen in our clinic in 2023.  At that time he was having mild difficulty with getting out of low chairs, using stairs, and was ambulating without assistive devices.  He did have some globus sensation when swallowing, but this was rare.  He continues to have rare episodes of swallowing difficulty, but denies choking episodes. This is only noticeable when attempting to eat difficult foods and drink at the same time.  In terms of breathing, he is not SOB with rest or some activity, but specific tasks such as climbing stairs and other proximal leg muscle exercise can cause some dyspnea, this is unchanged from last year.  He sleeps very well, getting about 9 hours per night.  Uses about 2 pillows each night, and was using 1 pillow last year, although he does not necessarily feel uncomfortable when lying flat during the evening and denies waking up with headache, also thinks he is well rested in the AM.  He follows with cardiology and Dr. Ashton, for which he received a PPM in May for atrial flutter.  There has been improvement in fatigue and palpitations since that time.      He continues to have some tremors of the hands, especially  during tasks, and only finds this mildly troublesome but it has not interrupted any function. In terms of weakness, he has only 1 fall in the last year without any worsening of tripping or mobility changes.  He does not need assistive devices at this time.  Does notice some slight worsening of stairs and getting out of low chairs, he almost always needs to use his arms for pulling himself up in these scenarios.  Notices very mild finger weakness when pushing buttons or gripping, but this has not changed any tasks or functional decline.  Turning is bed is also difficult.  He denies myotonia or cramping.  Does have some constipation for which oatmeal and Dulcolax are helpful.       ROS: A 10-point ROS was performed as per HPI.    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     Past Surgical History:   Procedure Laterality Date    CATARACT EXTRACTION Bilateral 2019    COLONOSCOPY  2018    EP PACEMAKER DEVICE & LEAD IMPLANT- RIGHT ATRIAL, RIGHT & LEFT VENTRICULAR N/A 5/1/2023    Procedure: Pacemaker Device & Lead Implant- Right Atrial, Right & Left Ventricular;  Surgeon: Jenaro Hadley MD;  Location:  HEART CARDIAC CATH LAB    EYE SURGERY         Allergies:  Allergies   Allergen Reactions    Sulfa Antibiotics Dermatitis, Headache and Photosensitivity       Medications:      Current Outpatient Medications:     acetaminophen (TYLENOL) 500 MG tablet, Take 1,000 mg by mouth, Disp: , Rfl:     acetaminophen-codeine (TYLENOL #3) 300-30 MG tablet, Take 1-2 tablets by mouth every 4 hours as needed for moderate to severe pain, Disp: 10 tablet, Rfl: 0    finasteride (PROSCAR) 5 MG tablet, Take 1 tablet by mouth every morning, Disp: , Rfl:     losartan (COZAAR) 25 MG tablet, Take 0.5 tablets (12.5 mg) by mouth daily, Disp: 45 tablet, Rfl: 3    naproxen sodium 220 MG capsule, Take 440 mg by mouth as needed (as needed), Disp: , Rfl:     nystatin-triamcinolone (MYCOLOG) 650640-2.1  "UNIT/GM-% external ointment, Apply topically 2 times daily, Disp: , Rfl:     tamsulosin (FLOMAX) 0.4 MG capsule, TAKE ONE CAPSULE BY MOUTH AT BEDTIME, Disp: , Rfl:     Social History:  Social History     Tobacco Use    Smoking status: Never    Smokeless tobacco: Never   Substance Use Topics    Alcohol use: Yes     Comment: ocassionally       Family History:    No family history on file.      Physical Examination  Vitals: /76 (BP Location: Right arm, Patient Position: Sitting, Cuff Size: Adult Regular)   Pulse 66   Resp 16   Ht 1.89 m (6' 2.41\")   Wt 102.5 kg (225 lb 14.4 oz)   SpO2 99%   BMI 28.69 kg/m      General: Alert, interactive; NAD  HEENT: Normocephalic, atraumatic, nares patent, no oral lesions  Pulm: No increased work of breathing  Extremities: Warm and well perfused, peripheral pulses present, no edema  Musculoskeletal: No deformities of feet, hands, or limbs  Skin: Not jaundiced, no rash, no ecchymoses  Psych: Normal mood and affect    Neurologic:   Mental status: Attentive, interactive; Recalls remote and recent history with accuracy  Speech/Language: Fluent speech without paraphasic errors; No dysarthria  Cranial nerves: Visual fields intact to confrontation, Eyes conjugate on neutral gaze, Pupils 4mm and brisk, EOMI w/ normal and smooth pursuit, face expression symmetric, facial sensation intact and symmetric, hearing intact to conversation, shoulder shrug strong, palate rise symmetric, tongue/uvula midline.    Motor:   Bulk: Appropriate, no atrophy or hypertrophy appreciated  Tone: Appropriate, occasional paratonia  Spontaneous movements: No myoclonus, asterixis, or fasciculations  Inducible myotonia is present in the hands with strong     Power: *All strength assessments based on MRC grading  Pronator drift absent.   Neck flex 4, ext 5   Right Left   Arm abduction 4 4   Elbow Flex 5 5   Elbow Extension 4 4   Wrist Extension 5 5   FDI  5 5   APB 5 5   Fingertip Flexion 5 5     5 5 "   Hip Flexion 4- 4-   Hip Extension 4- 4-   Knee Flexion 5 5   Knee Extension 5 5   Dorsiflexion  5 5   Plantarflexion 5 5     Reflexes:    Right Left   Triceps 3 3   Biceps 3 3   BR 3 3   Patella 3 3   Achilles  2 2   Plantar down down   No crossed adductors or spread. No clonus    Coordination:   FNF intact bilaterally without dysmetria  Finger tapping with normal amplitude and frequency    Gait/Station:   Unable to rise unassisted from a seated position, needs to use hands  Gait is slightly wide based, trendelenburg present in both hip, slight hyperextension of the knees when landing      INVESTIGATIONS:  MRI C-spine (4/17/23):  IMPRESSION:   1. No acute abnormality of the cervical spine.   2. Normal appearing cervical cord, no abnormal enhancement.   3. Multilevel degenerative spondylosis changes most notable for moderate   central narrowing at C5-C6 as well as milder central narrowing at other levels   as above.  There is also multilevel foraminal narrowing as described.     PFT's:   Latest Reference Range & Units 01/10/22 01/23/23   FVC-Pre L 3.78 3.59   FVC-Pred L 5.20 4.75   FVC-%Pred-Pre % 72 75   MIP-Pre cmH2O -40 -30   MEP-Pre cmH2O 77 64         IMPRESSION/PLAN:    Rajni Lara is a 66 year old man who presents to the [facility] Neuromuscular department [facility] clinic for follow up of genetically confirmed type 2 myotonic dystrophy.  He has trace worsening of proximal leg weakness in the last year without any functional decline.  He has otherwise been stable in terms of hand function, swallowing, and rare myotonia.  The last 2 PFTs obtained showed some downtrend, so may need rechecking. Otherwise he has been relatively stable and his exam is reassuring.        Plan:  Your strength and mobility is essentially stable in the last year.     - You should continue to follow with cardiology for arrhythmia and pacemaker  - You will see PT today  - Pulmonary function testing today  - Follow up in 1  year      Patient seen and discussed with attending Dr. Jennifer Arias MD  Neuromuscular Medicine Fellow, PGY-5  [facility]    ATTENDING ADDENDUM: Patient seen and examined with neuromuscular fellow Dr. Arias today at the [facility].  I agree with his impression and plan.  Total time spent on this encounter today 20 minutes of which 10 face-to-face, 5 in postvisit note review, and editing, and 5 in previsit chart review.    The longitudinal plan of care for OJ Inveiss was addressed during this visit. Due to the added complexity in care, I will continue to support OJ in the subsequent management of this condition(s) and with the ongoing continuity of care of this condition(s): myotonic dystrophy type II          Again, thank you for allowing me to participate in the care of your patient.      Sincerely,    Reinaldo Rodriguez MD     done

## 2024-07-11 NOTE — ASU PREOP CHECKLIST - ASSESSMENT, HISTORY & PHYSICAL COMPLETED AND ON MEDICAL RECORD
Thank you for your visit with the [facility] today.    Medication changes and/or recommendations from today:   -  Continue your current medications without changes.   -  I've sent in a prescription for sublingual nitroglycerin for use as needed for episodes of chest pain.    Follow up plan:   - Come in for the coronary angiogram and possible stenting sometime in the next couple of weeks.  - Hold your eliquis for 48 hours before the procedure.  - Follow up with me in the clinic within a few following the angiogram.    If you have questions or concerns in the meantime please call the nurse team at [phone] or send a Trustlook message.     Scheduling phone number: [phone]    It was a pleasure seeing you today!     LAST Aleman, CNP  Nurse Practitioner  [facility]   
done

## 2024-08-05 ENCOUNTER — OFFICE (OUTPATIENT)
Dept: URBAN - METROPOLITAN AREA CLINIC 12 | Facility: CLINIC | Age: 84
Setting detail: OPHTHALMOLOGY
End: 2024-08-05
Payer: MEDICARE

## 2024-08-05 DIAGNOSIS — H35.371: ICD-10-CM

## 2024-08-05 DIAGNOSIS — H35.372: ICD-10-CM

## 2024-08-05 DIAGNOSIS — H34.8322: ICD-10-CM

## 2024-08-05 DIAGNOSIS — H16.223: ICD-10-CM

## 2024-08-05 DIAGNOSIS — Z96.1: ICD-10-CM

## 2024-08-05 DIAGNOSIS — H43.812: ICD-10-CM

## 2024-08-05 DIAGNOSIS — H35.351: ICD-10-CM

## 2024-08-05 PROCEDURE — 92014 COMPRE OPH EXAM EST PT 1/>: CPT | Performed by: OPHTHALMOLOGY

## 2024-08-05 PROCEDURE — 92250 FUNDUS PHOTOGRAPHY W/I&R: CPT | Performed by: OPHTHALMOLOGY

## 2024-08-05 ASSESSMENT — CONFRONTATIONAL VISUAL FIELD TEST (CVF)
OD_FINDINGS: FULL
OS_FINDINGS: FULL

## 2025-09-07 ENCOUNTER — NON-APPOINTMENT (OUTPATIENT)
Age: 85
End: 2025-09-07